# Patient Record
Sex: MALE | Race: WHITE | NOT HISPANIC OR LATINO | Employment: FULL TIME | URBAN - METROPOLITAN AREA
[De-identification: names, ages, dates, MRNs, and addresses within clinical notes are randomized per-mention and may not be internally consistent; named-entity substitution may affect disease eponyms.]

---

## 2022-03-03 ENCOUNTER — APPOINTMENT (OUTPATIENT)
Dept: CT IMAGING | Facility: HOSPITAL | Age: 22
End: 2022-03-03

## 2022-03-03 ENCOUNTER — HOSPITAL ENCOUNTER (EMERGENCY)
Facility: HOSPITAL | Age: 22
Discharge: HOME | End: 2022-03-03
Attending: EMERGENCY MEDICINE | Admitting: EMERGENCY MEDICINE

## 2022-03-03 VITALS
WEIGHT: 189.6 LBS | TEMPERATURE: 98 F | BODY MASS INDEX: 25.68 KG/M2 | HEIGHT: 72 IN | SYSTOLIC BLOOD PRESSURE: 121 MMHG | OXYGEN SATURATION: 97 % | HEART RATE: 68 BPM | DIASTOLIC BLOOD PRESSURE: 64 MMHG | RESPIRATION RATE: 15 BRPM

## 2022-03-03 DIAGNOSIS — S00.81XA ABRASION OF FACE, INITIAL ENCOUNTER: ICD-10-CM

## 2022-03-03 DIAGNOSIS — R55 VASOVAGAL SYNCOPE: Primary | ICD-10-CM

## 2022-03-03 DIAGNOSIS — S01.81XA FACIAL LACERATION, INITIAL ENCOUNTER: ICD-10-CM

## 2022-03-03 PROCEDURE — 93005 ELECTROCARDIOGRAM TRACING: CPT | Performed by: EMERGENCY MEDICINE

## 2022-03-03 PROCEDURE — 70486 CT MAXILLOFACIAL W/O DYE: CPT

## 2022-03-03 PROCEDURE — 99283 EMERGENCY DEPT VISIT LOW MDM: CPT

## 2022-03-03 PROCEDURE — 70450 CT HEAD/BRAIN W/O DYE: CPT

## 2022-03-03 PROCEDURE — 93005 ELECTROCARDIOGRAM TRACING: CPT

## 2022-03-03 NOTE — DISCHARGE INSTRUCTIONS
Keep the wound clean dry and covered  Use bacitracin 2 or 3 times a day until sutures are removed    Have the sutures removed in 5 days with primary care or urgent care    Return to the ER for worsening symptoms

## 2022-03-03 NOTE — ED PROVIDER NOTES
Subjective   Patient is a 21-year-old male that states he has not eaten well today and he got up out of his call and was hurrying and stood up very quickly he states he immediately felt faint and tried to catch himself but had a syncopal episode and he fell forward striking his face states he awoke immediately and he does not have headache he does have a laceration to the left side of his face and over his philtrum.  He states he is up-to-date on his tetanus.  He rates his pain a 5/10.  He states that it is mainly in his face and nasal bones did not have a nosebleed.  He denies any back or neck pain he denies any numbness or tingling he denies any nausea or headache          Review of Systems   Constitutional: Negative for chills, fatigue and fever.   HENT: Negative for congestion, tinnitus and trouble swallowing.    Eyes: Negative for photophobia, discharge and redness.   Respiratory: Negative for cough and shortness of breath.    Cardiovascular: Negative for chest pain and palpitations.   Gastrointestinal: Negative for abdominal pain, diarrhea, nausea and vomiting.   Genitourinary: Negative for dysuria, frequency and urgency.   Musculoskeletal: Negative for back pain, joint swelling and myalgias.   Skin: Positive for wound. Negative for rash.   Neurological: Positive for syncope. Negative for dizziness and headaches.   Psychiatric/Behavioral: Negative for confusion.   All other systems reviewed and are negative.      History reviewed. No pertinent past medical history.    No Known Allergies    History reviewed. No pertinent surgical history.    History reviewed. No pertinent family history.    Social History     Socioeconomic History   • Marital status: Single           Objective   Physical Exam  Vitals reviewed.   Constitutional:       Appearance: Normal appearance. He is well-developed and normal weight.   HENT:      Head: Normocephalic and atraumatic.      Jaw: There is normal jaw occlusion. No tenderness or  pain on movement.      Right Ear: Tympanic membrane and external ear normal.      Left Ear: Tympanic membrane, ear canal and external ear normal.      Nose: Signs of injury and nasal tenderness present. No septal deviation, mucosal edema or rhinorrhea.      Right Nostril: No epistaxis or septal hematoma.      Left Nostril: No epistaxis or septal hematoma.     Eyes:      Conjunctiva/sclera: Conjunctivae normal.      Pupils: Pupils are equal, round, and reactive to light.   Cardiovascular:      Rate and Rhythm: Normal rate and regular rhythm.      Heart sounds: Normal heart sounds.   Pulmonary:      Effort: Pulmonary effort is normal.      Breath sounds: Normal breath sounds.   Abdominal:      General: Bowel sounds are normal.      Palpations: Abdomen is soft.   Musculoskeletal:         General: Normal range of motion.      Cervical back: Normal range of motion and neck supple.   Skin:     General: Skin is warm and dry.   Neurological:      Mental Status: He is alert and oriented to person, place, and time.      GCS: GCS eye subscore is 4. GCS verbal subscore is 5. GCS motor subscore is 6.         Laceration Repair    Date/Time: 3/3/2022 3:24 AM  Performed by: Hina Brewer APRN  Authorized by: Efrem Patterson DO     Consent:     Consent obtained:  Verbal    Consent given by:  Patient    Risks discussed:  Pain, poor cosmetic result and poor wound healing  Anesthesia (see MAR for exact dosages):     Anesthesia method:  Local infiltration    Local anesthetic:  Lidocaine 1% WITH epi  Laceration details:     Location:  Face    Facial location: left lateral to the eye.    Length (cm):  2.5  Repair type:     Repair type:  Simple  Pre-procedure details:     Preparation:  Patient was prepped and draped in usual sterile fashion  Exploration:     Hemostasis achieved with:  Direct pressure and epinephrine    Wound exploration: wound explored through full range of motion      Contaminated: no    Treatment:     Area  "cleansed with:  Betadine    Amount of cleaning:  Standard    Irrigation solution:  Sterile saline    Irrigation volume:  100  Skin repair:     Repair method:  Sutures    Suture size:  5-0    Suture material:  Prolene    Suture technique:  Simple interrupted    Number of sutures:  6  Approximation:     Approximation:  Close  Post-procedure details:     Dressing:  Antibiotic ointment    Patient tolerance of procedure:  Tolerated well, no immediate complications    Laceration Repair    Date/Time: 3/3/2022 3:47 AM  Performed by: Hina Brewer APRN  Authorized by: Efrem Patterson DO     Consent:     Consent obtained:  Verbal    Consent given by:  Patient    Risks discussed:  Pain, poor cosmetic result and poor wound healing  Anesthesia (see MAR for exact dosages):     Anesthesia method:  Local infiltration    Local anesthetic:  Lidocaine 1% WITH epi  Laceration details:     Location: philtrum.    Length (cm):  1  Repair type:     Repair type:  Simple  Pre-procedure details:     Preparation:  Patient was prepped and draped in usual sterile fashion  Exploration:     Hemostasis achieved with:  Epinephrine and direct pressure    Wound exploration: wound explored through full range of motion      Contaminated: no    Treatment:     Area cleansed with:  Hibiclens    Amount of cleaning:  Standard    Irrigation volume:  100  Skin repair:     Repair method:  Sutures    Suture size:  5-0    Suture material:  Prolene    Suture technique:  Simple interrupted    Number of sutures:  2  Approximation:     Approximation:  Close  Post-procedure details:     Dressing:  Antibiotic ointment    Patient tolerance of procedure:  Tolerated well, no immediate complications             EKG shows sinus rhythm with rate of 62 no ectopy no ST elevation reviewed by myself and read by Dr. Bates  ED Course      /70 (BP Location: Left arm, Patient Position: Sitting)   Pulse 71   Temp 98 °F (36.7 °C)   Resp 15   Ht 182.9 cm (72\") "   Wt 86 kg (189 lb 9.5 oz)   SpO2 96%   BMI 25.71 kg/m²   Labs Reviewed - No data to display  Medications - No data to display  CT Head Without Contrast    Result Date: 3/3/2022  1. No acute intracranial process. Left periorbital soft tissue swelling/hematoma.  Electronically signed by:  Collin Brooks M.D.  3/3/2022 1:01 AM    CT Facial Bones Without Contrast    Result Date: 3/3/2022   1. No acute osseous abnormality. Left periorbital soft tissue swelling/hematoma.  Electronically signed by:  Collin Brooks M.D.  3/3/2022 1:05 AM                                               MDM  Number of Diagnoses or Management Options  Abrasion of face, initial encounter  Facial laceration, initial encounter  Vasovagal syncope  Diagnosis management comments: Patient had above exam CT of the bones and brain were within normal limits.  The patient did not wish to have anything for pain.  He was not found to be orthostatic he had no symptoms while in the emergency room he states that he ate something before he came in he feels much better now and does not feel lightheaded or weak-he has had no nausea or vomiting he does not complain of headache.  He had above exam and procedure as document.    He was advised to keep the wound clean dry and covered with bacitracin watch for signs of infection and have the sutures removed in 5 days he verbalized understood discharge instructions he was given a work note for today       Amount and/or Complexity of Data Reviewed  Tests in the radiology section of CPT®: reviewed  Tests in the medicine section of CPT®: reviewed    Risk of Complications, Morbidity, and/or Mortality  Presenting problems: low  Diagnostic procedures: low  Management options: low    Patient Progress  Patient progress: improved      Final diagnoses:   Vasovagal syncope   Facial laceration, initial encounter   Abrasion of face, initial encounter       ED Disposition  ED Disposition     ED Disposition Condition  Comment    Discharge Stable           Narendra Hahn MD  [address] [facility] [place] [address]  [phone]    In 3 days  If symptoms worsen, As needed    Joseph Whittington MD  [address] [place] [address]  [phone]    In 5 days           Medication List      No changes were made to your prescriptions during this visit.          Hina Brewer, APRN  03/03/22 0353

## 2022-03-03 NOTE — ED NOTES
Pt. Had syncopal episode tonight around 23:30 while getting out of car. Laceration to upper lip and lateral aspect of left eye. No active bleeding, scant serosanguinous drainage.       Anna Ignacio, RN  03/03/22 023

## 2022-03-06 LAB — QT INTERVAL: 375 MS
